# Patient Record
Sex: FEMALE | Race: WHITE | NOT HISPANIC OR LATINO | URBAN - METROPOLITAN AREA
[De-identification: names, ages, dates, MRNs, and addresses within clinical notes are randomized per-mention and may not be internally consistent; named-entity substitution may affect disease eponyms.]

---

## 2018-04-09 ENCOUNTER — OFFICE VISIT - HEALTHEAST (OUTPATIENT)
Dept: FAMILY MEDICINE | Facility: CLINIC | Age: 48
End: 2018-04-09

## 2018-04-09 ENCOUNTER — RECORDS - HEALTHEAST (OUTPATIENT)
Dept: GENERAL RADIOLOGY | Facility: CLINIC | Age: 48
End: 2018-04-09

## 2018-04-09 ENCOUNTER — COMMUNICATION - HEALTHEAST (OUTPATIENT)
Dept: FAMILY MEDICINE | Facility: CLINIC | Age: 48
End: 2018-04-09

## 2018-04-09 DIAGNOSIS — R20.0 NUMBNESS AND TINGLING OF BOTH FEET: ICD-10-CM

## 2018-04-09 DIAGNOSIS — M25.70 OSTEOPHYTE DETERMINED BY X-RAY: ICD-10-CM

## 2018-04-09 DIAGNOSIS — M54.42 ACUTE MIDLINE LOW BACK PAIN WITH LEFT-SIDED SCIATICA: ICD-10-CM

## 2018-04-09 DIAGNOSIS — R20.2 NUMBNESS AND TINGLING OF BOTH FEET: ICD-10-CM

## 2018-04-09 DIAGNOSIS — R25.2 MUSCLE CRAMPS AT NIGHT: ICD-10-CM

## 2018-04-09 DIAGNOSIS — M54.42 LUMBAGO WITH SCIATICA, LEFT SIDE: ICD-10-CM

## 2018-04-09 DIAGNOSIS — S81.801A LEG WOUND, RIGHT, INITIAL ENCOUNTER: ICD-10-CM

## 2018-04-09 LAB
BASOPHILS # BLD AUTO: 0.1 THOU/UL (ref 0–0.2)
BASOPHILS NFR BLD AUTO: 1 % (ref 0–2)
EOSINOPHIL # BLD AUTO: 0.4 THOU/UL (ref 0–0.4)
EOSINOPHIL NFR BLD AUTO: 5 % (ref 0–6)
ERYTHROCYTE [DISTWIDTH] IN BLOOD BY AUTOMATED COUNT: 12 % (ref 11–14.5)
HCT VFR BLD AUTO: 38.7 % (ref 35–47)
HGB BLD-MCNC: 12.8 G/DL (ref 12–16)
LYMPHOCYTES # BLD AUTO: 3.1 THOU/UL (ref 0.8–4.4)
LYMPHOCYTES NFR BLD AUTO: 35 % (ref 20–40)
MCH RBC QN AUTO: 29.6 PG (ref 27–34)
MCHC RBC AUTO-ENTMCNC: 33 G/DL (ref 32–36)
MCV RBC AUTO: 90 FL (ref 80–100)
MONOCYTES # BLD AUTO: 0.9 THOU/UL (ref 0–0.9)
MONOCYTES NFR BLD AUTO: 10 % (ref 2–10)
NEUTROPHILS # BLD AUTO: 4.4 THOU/UL (ref 2–7.7)
NEUTROPHILS NFR BLD AUTO: 50 % (ref 50–70)
PLATELET # BLD AUTO: 318 THOU/UL (ref 140–440)
PMV BLD AUTO: 7 FL (ref 7–10)
RBC # BLD AUTO: 4.31 MILL/UL (ref 3.8–5.4)
WBC: 8.9 THOU/UL (ref 4–11)

## 2018-04-09 ASSESSMENT — MIFFLIN-ST. JEOR: SCORE: 1418.28

## 2018-04-10 LAB
ALBUMIN SERPL-MCNC: 3.4 G/DL (ref 3.5–5)
ALP SERPL-CCNC: 102 U/L (ref 45–120)
ALT SERPL W P-5'-P-CCNC: 13 U/L (ref 0–45)
ANION GAP SERPL CALCULATED.3IONS-SCNC: 8 MMOL/L (ref 5–18)
AST SERPL W P-5'-P-CCNC: 20 U/L (ref 0–40)
BILIRUB SERPL-MCNC: 0.4 MG/DL (ref 0–1)
BUN SERPL-MCNC: 10 MG/DL (ref 8–22)
CALCIUM SERPL-MCNC: 8.9 MG/DL (ref 8.5–10.5)
CHLORIDE BLD-SCNC: 106 MMOL/L (ref 98–107)
CK SERPL-CCNC: 166 U/L (ref 30–190)
CO2 SERPL-SCNC: 25 MMOL/L (ref 22–31)
CREAT SERPL-MCNC: 0.65 MG/DL (ref 0.6–1.1)
GFR SERPL CREATININE-BSD FRML MDRD: >60 ML/MIN/1.73M2
GLUCOSE BLD-MCNC: 83 MG/DL (ref 70–125)
MAGNESIUM SERPL-MCNC: 2 MG/DL (ref 1.8–2.6)
POTASSIUM BLD-SCNC: 4.3 MMOL/L (ref 3.5–5)
PROT SERPL-MCNC: 6.6 G/DL (ref 6–8)
SODIUM SERPL-SCNC: 139 MMOL/L (ref 136–145)

## 2018-04-12 LAB — BACTERIA SPEC CULT: ABNORMAL

## 2018-04-13 ENCOUNTER — COMMUNICATION - HEALTHEAST (OUTPATIENT)
Dept: FAMILY MEDICINE | Facility: CLINIC | Age: 48
End: 2018-04-13

## 2018-04-17 ENCOUNTER — HOSPITAL ENCOUNTER (OUTPATIENT)
Dept: MRI IMAGING | Facility: CLINIC | Age: 48
Discharge: HOME | End: 2018-04-17
Attending: NURSE PRACTITIONER

## 2018-04-17 DIAGNOSIS — M25.70 OSTEOPHYTE DETERMINED BY X-RAY: ICD-10-CM

## 2018-04-17 DIAGNOSIS — R20.2 NUMBNESS AND TINGLING OF BOTH FEET: ICD-10-CM

## 2018-04-17 DIAGNOSIS — R20.0 NUMBNESS AND TINGLING OF BOTH FEET: ICD-10-CM

## 2018-04-17 DIAGNOSIS — M54.42 ACUTE MIDLINE LOW BACK PAIN WITH LEFT-SIDED SCIATICA: ICD-10-CM

## 2018-04-25 ENCOUNTER — COMMUNICATION - HEALTHEAST (OUTPATIENT)
Dept: VASCULAR SURGERY | Facility: CLINIC | Age: 48
End: 2018-04-25

## 2021-06-01 VITALS — HEIGHT: 69 IN | BODY MASS INDEX: 24.29 KG/M2 | WEIGHT: 164 LBS

## 2021-06-17 NOTE — PROGRESS NOTES
Assessment/Plan:       1. Acute midline low back pain with left-sided sciatica  Midline low back pain likely secondary to the multilevel degenerative changes in the back as well as osteophyte formation.  Further evaluation of this would be beneficial with an MRI.  We will likely place a referral in the spine care following MRI evaluation.  In the meantime I did recommend continuing symptomatic management and avoiding activities that make the back pain worse.    - XR Lumbar Spine 2 or 3 VWS; Future  - MR Lumbar Spine With Without Contrast; Future    2. Leg wound, right, initial encounter  Etiology of the leg wound is unclear however it does appear mildly vascular in nature.  Specifically with her history of smoking and concerned for possible peripheral artery disease.  I did place a referral into the vascular center as well as obtain a culture of the wound.  Given the appearance of the wound I am concerned that it is possibly MRSA.  Given this finding I will start her on doxycycline anticipating that it might be.  CBC does not show any infection.  A conference of metabolic panel is pending as well as a magnesium and a CK.  Patient will be notified of any changes or findings of the blood work and wound culture.  I will also send patient onto wound care for further evaluation and management.   - Culture, Wound  - Comprehensive Metabolic Panel  - HM1(CBC and Differential)  - Magnesium  - CK Total  - Ambulatory referral to Vascular Center  - HM1 (CBC with Diff)    3. Numbness and tingling of both feet  Numbness and tingling of bilateral feet likely secondary to the multilevel degenerative changes in her spine and osteophyte production.  Will get blood work as below to rule out any other abnormalities as well as an MRI to evaluate her spine further.    - Comprehensive Metabolic Panel  - HM1(CBC and Differential)  - Magnesium  - CK Total  - Ambulatory referral to Vascular Center  - HM1 (CBC with Diff)  - MR Lumbar Spine  With Without Contrast; Future    4. Muscle cramps at night  Muscle cramping at night could be secondary to her acute back pain and changes in her's lumbar spine as well as possible restless leg syndrome.  Will check a magnesium and a CK related to this.  This could be secondary to the findings of her lumbar spine x-ray and the degenerative changes seen as well.  She may have some neuropathy secondary to changes in her spinal column and compression.  - Magnesium  - CK Total    5. Osteophyte determined by x-ray  Large osteophyte was identified by x-ray.  MRI will be completed to evaluate this further.  It is likely she has some spinal column narrowing causing some of the symptoms that she is experiencing with numbness and tingling.  - MR Lumbar Spine With Without Contrast; Future        Subjective:      Saira Man is a 48 y.o. female who presents for concerns of leg cramping that has been present for about 1 year.  He is a new patient to [facility].  She has had minimal healthcare over the years.  She comes in with a couple concerns today.  Her first concern is painful legs that have been present with ongoing symptoms for the last 1 year.  She also has a sore on the outside of her right ankle.  In terms of her leg pain she reports it as more of a cramping and a muscle tightness.  It radiates in her thighs and will radiate down her legs.  She has occasional swelling however none of it is significant.  She has has had cramping and numbness in her hands as well that are similar to the symptoms. Symptoms have not specifically worsened in the last year.  However she describes the leg cramping as severe at times.  She reports that it feels like she has knots in her legs.  She also has had a sore on her right lower leg on the outside that was first noticed about a week ago.  She denies any fevers or pain associated with it she does not recall any injury to have caused to the wound.  It has been  weeping.  It has been red and the size has remained about the same.  It has some blistering around the outside of the ankle however no further redness or swelling around the area.  She also reports that she will have intermittent swelling in her lower extremities however it is not present today.  She also mentions intermittent back pain that is in the middle of her spine that radiates up her back as well as causes sciatic pain down her left leg.  Symptoms are worse when she is standing and doing the dishes.  She at times has difficulty getting comfortable at night.  In her hands as well. She has also had a sore on the side of her right leg that has been present for about one week.   She is a smoker and has smoked for at least last 30 years.  She has intermittently quit between some of these years.  She reports that she has smoked on and off since she was about 11 years old.      The following portions of the patient's history were reviewed and updated as appropriate: allergies, current medications, past family history, past medical history, past social history, past surgical history and problem list.    Past Medical History:   Diagnosis Date     Heart murmur     during a pregnancy     History reviewed. No pertinent surgical history.  Social History     Social History     Marital status: Single     Spouse name: N/A     Number of children: N/A     Years of education: N/A     Occupational History     Not on file.     Social History Main Topics     Smoking status: Current Every Day Smoker     Packs/day: 0.50     Years: 30.00     Smokeless tobacco: Never Used     Alcohol use No      Comment: quit 2 year ago     Drug use: No     Sexual activity: Yes     Partners: Male     Other Topics Concern     Not on file     Social History Narrative     No narrative on file     Family History   Problem Relation Age of Onset     No Medical Problems Mother      Heart disease Maternal Uncle      Diabetes Maternal Grandfather      Stroke  "Neg Hx      Current Outpatient Prescriptions   Medication Sig     doxycycline (VIBRA-TABS) 100 MG tablet Take 1 tablet (100 mg total) by mouth 2 (two) times a day for 10 days.     Review of Systems   Pertinent items are noted in HPI.      Objective:      /86  Pulse 80  Ht 5' 9\" (1.753 m)  Wt 164 lb (74.4 kg)  BMI 24.22 kg/m2    General appearance: alert, appears stated age and cooperative  Head: Normocephalic, without obvious abnormality, atraumatic  Ears: normal TM's and external ear canals both ears  Throat: Poor dental care overall.  No acute infections noted.  Posterior pharynx within normal limits.   Back: symmetric, no curvature. ROM normal. No CVA tenderness., no specific midline tenderness. ROM overall WNL. Muscles feel tight. Did not re-illicit pain with palpation.   Lungs: clear to auscultation bilaterally  Heart: regular rate and rhythm, S1, S2 normal, no murmur, click, rub or gallop  Extremities: extremities normal, atraumatic, no cyanosis or edema, Homans sign is negative, no sign of DVT and positive for right lower extremity ulcer on the lateral side of the ankle.   Pulses: 2+ and symmetric  Skin: 6.5 cm x 7cm erythemic lesion on the right lateral aspect of lower leg. blistering present around the wound. wound appears vascular. Wound is wet.   Lymph nodes: Cervical, supraclavicular, and axillary nodes normal.  Neurologic: Grossly normal     Results for orders placed or performed in visit on 04/09/18   HM1 (CBC with Diff)   Result Value Ref Range    WBC 8.9 4.0 - 11.0 thou/uL    RBC 4.31 3.80 - 5.40 mill/uL    Hemoglobin 12.8 12.0 - 16.0 g/dL    Hematocrit 38.7 35.0 - 47.0 %    MCV 90 80 - 100 fL    MCH 29.6 27.0 - 34.0 pg    MCHC 33.0 32.0 - 36.0 g/dL    RDW 12.0 11.0 - 14.5 %    Platelets 318 140 - 440 thou/uL    MPV 7.0 7.0 - 10.0 fL    Neutrophils % 50 50 - 70 %    Lymphocytes % 35 20 - 40 %    Monocytes % 10 2 - 10 %    Eosinophils % 5 0 - 6 %    Basophils % 1 0 - 2 %    Neutrophils " Absolute 4.4 2.0 - 7.7 thou/uL    Lymphocytes Absolute 3.1 0.8 - 4.4 thou/uL    Monocytes Absolute 0.9 0.0 - 0.9 thou/uL    Eosinophils Absolute 0.4 0.0 - 0.4 thou/uL    Basophils Absolute 0.1 0.0 - 0.2 thou/uL     Xray: lumbar spine xray shows degenerative changes and osteophyte presence. Large osteophyte present at L3-L4 after discussion with radiologist at [facility] to confirm findings.  Recommend MRI for further evaluation.